# Patient Record
Sex: FEMALE | Race: WHITE
[De-identification: names, ages, dates, MRNs, and addresses within clinical notes are randomized per-mention and may not be internally consistent; named-entity substitution may affect disease eponyms.]

---

## 2017-01-25 ENCOUNTER — HOSPITAL ENCOUNTER (OUTPATIENT)
Dept: HOSPITAL 80 - FIMAGING | Age: 57
End: 2017-01-25
Attending: GENERAL ACUTE CARE HOSPITAL
Payer: COMMERCIAL

## 2017-01-25 DIAGNOSIS — Z12.31: Primary | ICD-10-CM

## 2017-01-25 PROCEDURE — G0202 SCR MAMMO BI INCL CAD: HCPCS

## 2017-01-25 NOTE — MA
Screening Digital Mammogram With Tomosynthesis



Clinical Indications: Routine screening. 



Technique:  Standard digital cephalocaudal and tomosynthesis mediolateral oblique projections are obt
ained. An additional cc view is performed of the left breast. The digital images were processed by [facility] [employer] computer aided detection system. 



Comparison: June 2015, May 2014, April 2013 and March 2012



Breast density: B; There are scattered fibroglandular densities.



Findings: CAD was reviewed.  No suspicious findings are identified.



Impression: Negative mammogram. BI-RADS 1. 



Recommendation:   Routine screening is recommended in one year.



[facility] will send a result letter to the patient.



Negative mammography should not preclude additional workup of a clinically suspicious finding.



The patient's information is entered into a reminder system with a target due date for her next mammo
gram.

## 2017-12-20 ENCOUNTER — HOSPITAL ENCOUNTER (EMERGENCY)
Dept: HOSPITAL 80 - CED | Age: 57
Discharge: HOME | End: 2017-12-20
Payer: COMMERCIAL

## 2017-12-20 VITALS — TEMPERATURE: 98 F | RESPIRATION RATE: 18 BRPM

## 2017-12-20 VITALS — SYSTOLIC BLOOD PRESSURE: 135 MMHG | OXYGEN SATURATION: 97 % | DIASTOLIC BLOOD PRESSURE: 62 MMHG | HEART RATE: 62 BPM

## 2017-12-20 DIAGNOSIS — R10.31: Primary | ICD-10-CM

## 2017-12-20 LAB
% IMMATURE GRANULYOCYTES: 0.4 % (ref 0–1.1)
ABSOLUTE IMMATURE GRANULOCYTES: 0.03 10^3/UL (ref 0–0.1)
ABSOLUTE NRBC COUNT: 0 10^3/UL (ref 0–0.01)
ADD DIFF?: NO
ADD MORPH?: NO
ADD SCAN?: NO
ALBUMIN SERPL-MCNC: 4.5 G/DL (ref 3.5–5)
ALP SERPL-CCNC: 48 IU/L (ref 38–126)
ALT SERPL-CCNC: 34 IU/L (ref 9–52)
ANION GAP SERPL CALC-SCNC: 17 MEQ/L (ref 8–16)
AST SERPL-CCNC: 26 IU/L (ref 14–46)
ATYPICAL LYMPHOCYTE FLAG: 0 (ref 0–99)
BILIRUB SERPL-MCNC: 0.7 MG/DL (ref 0.1–1.4)
CALCIUM SERPL-MCNC: 9.4 MG/DL (ref 8.5–10.4)
CHLORIDE SERPL-SCNC: 96 MEQ/L (ref 97–110)
CO2 SERPL-SCNC: 27 MEQ/L (ref 22–31)
COLOR UR: YELLOW
CREAT SERPL-MCNC: 0.8 MG/DL (ref 0.6–1)
ERYTHROCYTE [DISTWIDTH] IN BLOOD BY AUTOMATED COUNT: 12.7 % (ref 11.5–15.2)
FRAGMENT RBC FLAG: 0 (ref 0–99)
GFR SERPL CREATININE-BSD FRML MDRD: > 60 ML/MIN/{1.73_M2}
GLUCOSE SERPL-MCNC: 128 MG/DL (ref 70–100)
HCT VFR BLD CALC: 44.9 % (ref 38–47)
HGB BLD-MCNC: 14.9 G/DL (ref 12.6–16.3)
LEFT SHIFT FLG: 0 (ref 0–99)
LIPEMIA HEMOLYSIS FLAG: 80 (ref 0–99)
MCH RBC BLDCO QN: 31.4 PG (ref 27.9–34.1)
MCHC RBC AUTO-ENTMCNC: 33.2 G/DL (ref 32.4–36.7)
MCV RBC AUTO: 94.7 FL (ref 81.5–99.8)
NITRITE UR QL STRIP: NEGATIVE
NRBC-AUTO%: 0 % (ref 0–0.2)
PH UR STRIP: 6 [PH] (ref 5–7.5)
PLATELET # BLD: 291 10^3/UL (ref 150–400)
PLATELET CLUMPS FLAG: 0 (ref 0–99)
PMV BLD AUTO: 9.4 FL (ref 8.7–11.7)
POTASSIUM SERPL-SCNC: 3.5 MEQ/L (ref 3.5–5.2)
PROT SERPL-MCNC: 7.8 G/DL (ref 6.3–8.2)
RBC # BLD AUTO: 4.74 10^6/UL (ref 4.18–5.33)
SODIUM SERPL-SCNC: 140 MEQ/L (ref 134–144)
SP GR UR STRIP: <= 1.005 (ref 1–1.03)
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
## 2018-03-08 ENCOUNTER — HOSPITAL ENCOUNTER (OUTPATIENT)
Dept: HOSPITAL 80 - FIMAGING | Age: 58
End: 2018-03-08
Attending: INTERNAL MEDICINE
Payer: COMMERCIAL

## 2018-03-08 DIAGNOSIS — Z12.31: Primary | ICD-10-CM

## 2018-03-21 ENCOUNTER — HOSPITAL ENCOUNTER (OUTPATIENT)
Dept: HOSPITAL 80 - FIMAGING | Age: 58
End: 2018-03-21
Attending: INTERNAL MEDICINE
Payer: COMMERCIAL

## 2018-03-21 DIAGNOSIS — N63.20: Primary | ICD-10-CM

## 2018-04-10 ENCOUNTER — HOSPITAL ENCOUNTER (OUTPATIENT)
Dept: HOSPITAL 80 - FIMAGING | Age: 58
Discharge: HOME | End: 2018-04-10
Attending: INTERNAL MEDICINE
Payer: COMMERCIAL

## 2018-04-10 DIAGNOSIS — N60.12: ICD-10-CM

## 2018-04-10 DIAGNOSIS — D24.2: Primary | ICD-10-CM

## 2018-04-10 DIAGNOSIS — N60.82: ICD-10-CM

## 2018-04-10 PROCEDURE — 0HBU3ZX EXCISION OF LEFT BREAST, PERCUTANEOUS APPROACH, DIAGNOSTIC: ICD-10-PCS | Performed by: RADIOLOGY

## 2018-04-10 PROCEDURE — BH01ZZZ PLAIN RADIOGRAPHY OF LEFT BREAST: ICD-10-PCS | Performed by: RADIOLOGY

## 2018-05-05 ENCOUNTER — HOSPITAL ENCOUNTER (OUTPATIENT)
Dept: HOSPITAL 80 - FED | Age: 58
Setting detail: OBSERVATION
LOS: 1 days | Discharge: HOME | End: 2018-05-06
Attending: FAMILY MEDICINE | Admitting: FAMILY MEDICINE
Payer: COMMERCIAL

## 2018-05-05 DIAGNOSIS — E87.0: Primary | ICD-10-CM

## 2018-05-05 DIAGNOSIS — E87.8: ICD-10-CM

## 2018-05-05 DIAGNOSIS — E87.6: ICD-10-CM

## 2018-05-05 DIAGNOSIS — D50.9: ICD-10-CM

## 2018-05-05 DIAGNOSIS — R11.2: ICD-10-CM

## 2018-05-05 DIAGNOSIS — K59.00: ICD-10-CM

## 2018-05-05 LAB — PLATELET # BLD: 249 10^3/UL (ref 150–400)

## 2018-05-05 PROCEDURE — G0378 HOSPITAL OBSERVATION PER HR: HCPCS

## 2018-05-05 PROCEDURE — 74018 RADEX ABDOMEN 1 VIEW: CPT

## 2018-05-05 RX ADMIN — ACETAMINOPHEN PRN MG: 325 TABLET ORAL at 17:43

## 2018-05-05 RX ADMIN — DOCUSATE SODIUM AND SENNOSIDES SCH TAB: 50; 8.6 TABLET ORAL at 10:40

## 2018-05-05 RX ADMIN — ENOXAPARIN SODIUM SCH: 100 INJECTION SUBCUTANEOUS at 10:40

## 2018-05-05 RX ADMIN — DOCUSATE SODIUM AND SENNOSIDES SCH: 50; 8.6 TABLET ORAL at 22:33

## 2018-05-05 NOTE — PDGENHP
History and Physical





- Chief Complaint


Nausea vomiting the constipation right shoulder pain





- History of Present Illness








Source-patient provides history appears reliable.   at bedside 

supplements details.  EMR reviewed and case discussed with ED provider.





HPI-pleasant 57-year-old female with past medical history significant for TBI, 

depression, functional constipation who presents to the emergency department 

this morning with complaints of 12 hr history of nausea, vomiting, abdominal 

pain, constipation and shoulder pain.  Patient underwent a right rotator cuff 

repair with Dr. Luciano on Thursday.  She is discharged with prescriptions for 

Oxy IR, oxy CR and Flexeril.  Patient had been tolerating her pain regimen 

until yesterday.  She noticed increased abdominal distension and worsened 

constipation.  Patient does not believe she has passed any flatus since 

Thursday.  She denies any history of melena or hematochezia.  She has a 

longstanding history of constipation.  He she denies any fevers she did have 

some chills just before coming to the emergency department she felt secondary 

to pain.  No sick contacts at home.  Patient reports that she developed 

epigastric abdominal cramping type pain but she is unsure if this started after 

her episodes of nausea vomiting or before. 





In the emergency department patient received Zofran and IV Dilaudid with 

improvement in her symptoms and pain.





History Information





- Allergies/Home Medication List


Allergies/Adverse Reactions: 








No Known Allergies Allergy (Unverified 04/04/16 19:34)


 





Home Medications: 








Wellbutrin 150mg SR (RX)  04/04/16 [Last Taken Unknown]


traZODONE  04/04/16 [Last Taken Unknown]


Cymbalta  12/20/17 [Last Taken Unknown]


Estradiol  12/20/17 [Last Taken Unknown]


Progesterone  12/20/17 [Last Taken Unknown]


Cyclobenzaprine  05/05/18 [Last Taken Unknown]


Oxycodone HCl  05/05/18 [Last Taken Unknown]


Oxycodone HCl ER  05/05/18 [Last Taken Unknown]





I have personally reviewed and updated: family history, medical history, social 

history, surgical history





- Past Medical History


Additional medical history: Depression, TBI, functional constipation.





- Family History


Additional family history: No family history of GI issues.





- Social History


Smoking Status: Never smoked


Alcohol Use: Occasionally


Drug Use: None


Additional social history: Patient is  lives with her  locally.  

Cor status-full





Review of Systems


Review of Systems: 





ROS: 10pt was reviewed & negative except for what was stated in HPI & below


Constitutional: Reports: chills.  Denies: fever, weakness


Cardiac: Reports: no symptoms.  Denies: edema, palpitations


Respiratory: Reports: no symptoms.  Denies: shortness of breath


Gastrointestinal: Reports: vomitting, abdominal pain, constipation, nausea, 

other (See HPI)


Genitourinary: Reports: no symptoms.  Denies: dysuria, hematuria


Muscolosketal: Reports: joint pain (Right shoulder), muscle pain (Right shoulder

)


Skin: Reports: no symptoms


Neurological: Reports: depressed (No SI or HI), numbness, tingling (Right hand 

fingers).  Denies: headache, weakness


Hematologic/Lymphatic: Reports: no symptoms





Physical Exam


Physical Exam: 








 Selected Entries











  05/05/18 05/05/18





  02:29 04:49


 


Blood Pressure Automatic Automatic





Method  


 


Heart Rate 77 69


 


Respiratory 18 18





Rate  


 


O2 Sat (%) 95 98


 


Temperature (C) 37.2 C 


 


Blood Pressure 161/91 H 138/88 H


 


Mean Arterial 114 H 104 H





Pressure (MAP)  


 


O2 (L/minute)  2


 


O2 Delivery Room Air Nasal Cannula





Mode  


 


Temperature Oral 





Source  


























Constitutional: no apparent distress, uncomfortable, other (Patient appears 

fatigued)


Eyes: PERRL, anicteric sclera, EOMI, No scleral injection


Ears, Nose, Mouth, Throat: moist mucous membranes, no oral mucosal ulcers, 

other (No nasal discharge)


Cardiovascular: regular rate and rhythym, no murmur, rub, or gallop, pulses 

symmetric bilaterally, No edema


Peripheral Pulses: 2+: dorsalis-pedis (R), dorsalis-pedis (L)


Respiratory: no respiratory distress, no rales or rhonchi, clear to auscultation


Gastrointestinal: normoactive bowel sounds, soft, non-tender abdomen, no 

palpable masses, distension (Abdomen soft but full.)


Genitourinary: no bladder tenderness, No carmona in urethra


Skin: warm, normal color, no rashes or abrasions, other (Mild pallor)


Musculoskeletal: generalized weakness, other (Right arm in immobilizer.  Moves 

distal extremities.)


Neurologic: AAOx3, sensation intact bilaterally, other (Grossly nonfocal), No 

facial droop


Psychiatric: interacting appropriately, not anxious, not encephalopathic, 

thought process linear, flat affect





Lab Data & Imaging Review





 05/05/18 02:50





 05/05/18 02:50














WBC  9.99 10^3/uL (3.80-9.50)  H  05/05/18  02:50    


 


RBC  3.75 10^6/uL (4.18-5.33)  L  05/05/18  02:50    


 


Hgb  12.2 g/dL (12.6-16.3)  L  05/05/18  02:50    


 


Hct  34.8 % (38.0-47.0)  L  05/05/18  02:50    


 


MCV  92.8 fL (81.5-99.8)   05/05/18  02:50    


 


MCH  32.5 pg (27.9-34.1)   05/05/18  02:50    


 


MCHC  35.1 g/dL (32.4-36.7)   05/05/18  02:50    


 


RDW  12.1 % (11.5-15.2)   05/05/18  02:50    


 


Plt Count  249 10^3/uL (150-400)   05/05/18  02:50    


 


MPV  9.4 fL (8.7-11.7)   05/05/18  02:50    


 


Neut % (Auto)  76.7 % (39.3-74.2)  H  05/05/18  02:50    


 


Lymph % (Auto)  14.3 % (15.0-45.0)  L  05/05/18  02:50    


 


Mono % (Auto)  8.0 % (4.5-13.0)   05/05/18  02:50    


 


Eos % (Auto)  0.2 % (0.6-7.6)  L  05/05/18  02:50    


 


Baso % (Auto)  0.3 % (0.3-1.7)   05/05/18  02:50    


 


Nucleat RBC Rel Count  0.0 % (0.0-0.2)   05/05/18  02:50    


 


Absolute Neuts (auto)  7.66 10^3/uL (1.70-6.50)  H  05/05/18  02:50    


 


Absolute Lymphs (auto)  1.43 10^3/uL (1.00-3.00)   05/05/18  02:50    


 


Absolute Monos (auto)  0.80 10^3/uL (0.30-0.80)   05/05/18  02:50    


 


Absolute Eos (auto)  0.02 10^3/uL (0.03-0.40)  L  05/05/18  02:50    


 


Absolute Basos (auto)  0.03 10^3/uL (0.02-0.10)   05/05/18  02:50    


 


Absolute Nucleated RBC  0.00 10^3/uL (0-0.01)   05/05/18  02:50    


 


Immature Gran %  0.5 % (0.0-1.1)   05/05/18  02:50    


 


Immature Gran #  0.05 10^3/uL (0.00-0.10)   05/05/18  02:50    


 


Sodium  124 mEq/L (135-145)  L  05/05/18  02:50    


 


Potassium  3.6 mEq/L (3.5-5.2)   05/05/18  02:50    


 


Chloride  86 mEq/L ()  L  05/05/18  02:50    


 


Carbon Dioxide  27 mEq/l (22-31)   05/05/18  02:50    


 


Anion Gap  11 mEq/L (8-16)   05/05/18  02:50    


 


BUN  10 mg/dL (7-23)   05/05/18  02:50    


 


Creatinine  0.6 mg/dL (0.6-1.0)   05/05/18  02:50    


 


Estimated GFR  > 60   05/05/18  02:50    


 


Glucose  110 mg/dL ()  H  05/05/18  02:50    


 


Serum Osmolality  257 mosmo/kg (280-297)  L  05/05/18  02:50    


 


Calcium  8.4 mg/dL (8.5-10.4)  L  05/05/18  02:50    


 


Magnesium  1.8 mg/dL (1.6-2.3)   05/05/18  02:50    


 


Total Bilirubin  0.9 mg/dL (0.1-1.4)   05/05/18  02:50    


 


Conjugated Bilirubin  0.2 mg/dL (0.0-0.5)   05/05/18  02:50    


 


Unconjugated Bilirubin  0.7 mg/dL (0.0-1.1)   05/05/18  02:50    


 


AST  25 IU/L (14-46)   05/05/18  02:50    


 


ALT  34 IU/L (9-52)   05/05/18  02:50    


 


Alkaline Phosphatase  44 IU/L ()   05/05/18  02:50    


 


Total Protein  6.5 g/dL (6.3-8.2)   05/05/18  02:50    


 


Albumin  3.9 g/dL (3.5-5.0)   05/05/18  02:50    


 


Lipase  125 IU/L ()   05/05/18  02:50    








Imaging Review: 





KUB reviewed myself-nonspecific bowel gas pattern.  Constipation.  Air-fluid 

level in the stomach.





Assessment & Plan


Assessment: 








57-year-old female POD #2  status post right rotator cuff repair presents to 

the ED with nausea vomiting constipation and right shoulder pain.





Hyponatremia (Acute) - patient with acute nausea vomiting with GI losses.  His 

serum osmolality slightly decreased representing hypovolemic hyponatremia.  

Patient received 1 L fluid bolus in the emergency department.  Will continue 

with some gentle IV fluid hydration as patient's nausea improves and advance 

diet encourage oral hydration.  Check a UA and urine studies.  Repeat BMP.





Nausea/Vomiting (Acute) - likely multifactorial including narcotics and 

constipation.  Bowel regimen has been ordered.  Zofran and Phenergan available 

p.r.n. The patient has not been utilizing scheduled stool softeners since her 

surgery. 





Abdominal pain - patient complaining of some upper abdominal cramping and pain 

and likely related to patient's nausea vomiting.  Lipase is slightly elevated 

but this is less than 3 times upper limit of normal and likely also related to 

nausea vomiting.  LFTs within normal limits this does not represent 

pancreatitis.





Constipation (Acute) - bowel regimen ordered.





Hypochloremia in setting of hypovolemia and hyponatremia status post normal 

saline repeat BMP this morning.





Anemia microcytic-last CBC shows normal H&H in September 2017. No evidence of 

active bleeding.  Patient denies any history of melena or hematochezia.





FEN - continue with IV fluids.  Monitoring electrolytes replace if needed.  

Advance diet as tolerated.


PPX-SCDs.  Holding anticoagulation expecting a short hospital stay.  Encourage 

ambulation.


Cor status-full


Disposition-patient admitted to observation status on the medical floor at this 

time.  Pending repeat laboratory studies, hydration and bowel movement possible 

discharge later today or tomorrow.
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
## 2018-05-05 NOTE — HOSPPROG
Hospitalist Progress Note


Assessment/Plan: 





57-year-old female POD #2 status post right rotator cuff repair presents to the 

ED with nausea vomiting constipation and right shoulder pain. First encounter, 

chart reviewed.





#Hyponatremia (Acute) - 


-patient with acute nausea vomiting with GI losses. 


-serum osmolality slightly decreased representing hypovolemic hyponatremia.  


-Patient received 1 L fluid bolus in the emergency department.  


-Will continue with some gentle IV fluid hydration as patient's nausea improves 

and advance diet encourage oral hydration.  


-urine studies unclear.  Repeat BMP in am





#Nausea/Vomiting (Acute) - 


-likely multifactorial including narcotics and constipation.  


-Bowel regimen has been ordered.  


-Zofran and Phenergan available p.r.n. 


-The patient has not been utilizing scheduled stool softeners since her 

surgery. 





#Abdominal pain - 


-patient complaining of some upper abdominal cramping and pain and likely 

related to patient's nausea vomiting.  


-Lipase is normal


- LFTs within normal limits this does not represent pancreatitis.





#Constipation (Acute) -


-bowel regimen ordered.





#Hypochloremia-


-in setting of hypovolemia and hyponatremia status post normal saline repeat 

BMP in am





#Hypokalemia


-replace





#Anemia microcytic-


-last CBC shows normal H&H in September 2017. 


-post surgical


-No evidence of active bleeding.  Patient denies any history of melena or 

hematochezia.





#FEN - continue with IV fluids.  Monitoring electrolytes replace if needed.  

Advance diet as tolerated.





#PPX-SCDs.  Holding anticoagulation expecting a short hospital stay.  Encourage 

ambulation.





Cor status-full





#Disposition-cont observation


anticipate DC when sodium better and constipation resolves.


Subjective: Still feeling ill.


Objective: 


 Vital Signs











Temp Pulse Resp BP Pulse Ox


 


 36.8 C   71   20   123/73 H  94 


 


 05/05/18 11:46  05/05/18 11:46  05/05/18 11:46  05/05/18 11:46  05/05/18 11:46








 Laboratory Results





 05/05/18 07:32 





 











 05/04/18 05/05/18 05/06/18





 05:59 05:59 05:59


 


Intake Total  2000 1600


 


Output Total  500 2050


 


Balance  1500 -450














- Physical Exam


Constitutional: no apparent distress, appears nourished


Eyes: PERRL, anicteric sclera


Ears, Nose, Mouth, Throat: moist mucous membranes, hearing normal, ears appear 

normal


Cardiovascular: regular rate and rhythym, No JVD


Respiratory: no respiratory distress, reduced air movement


Gastrointestinal: No tenderness, No ascites


Skin: warm, normal color


Musculoskeletal: pain with ROM, generalized weakness


Neurologic: AAOx3


Psychiatric: not anxious, not encephalopathic, thought process linear





ICD10 Worksheet


Patient Problems: 


 Problems











Problem Status Onset


 


Constipation Acute  


 


Hyponatremia Acute  


 


Vomiting Acute

## 2018-05-06 VITALS — DIASTOLIC BLOOD PRESSURE: 72 MMHG | SYSTOLIC BLOOD PRESSURE: 105 MMHG

## 2018-05-06 RX ADMIN — ENOXAPARIN SODIUM SCH MG: 100 INJECTION SUBCUTANEOUS at 08:17

## 2018-05-06 RX ADMIN — ACETAMINOPHEN PRN MG: 325 TABLET ORAL at 04:57

## 2018-05-06 RX ADMIN — ACETAMINOPHEN PRN MG: 325 TABLET ORAL at 09:05

## 2018-05-06 RX ADMIN — DOCUSATE SODIUM AND SENNOSIDES SCH: 50; 8.6 TABLET ORAL at 08:20

## 2018-05-06 NOTE — ASMTLACE
LACE

 

Length of stay for            Answers:  1 day                                 

current admission                                                             

Acuity / Level of             Answers:  No                                    

Care: Did the patient                                                         

have an inpatient                                                             

admission?                                                                    

Comorbidities - select        Answers:  Other                         Notes:  TBI

all that apply                                                                

# of Emergency department     Answers:  1-2                                   

visits in the last 6                                                          

months                                                                        

Social determinants           Answers:  Mental health diagnosis               

                                        (anxiety, depression, pers            

                                        onality disorders, etc.)              

Score: 6

 

Date Signed:  05/06/2018 11:24 AM

Electronically Signed By:Rebecca Suarez RN

## 2018-05-06 NOTE — ASDISCHSUM
----------------------------------------------

Discharge Information

----------------------------------------------

Plan Status:Home with No Needs                       Medically Cleared to Leave:05/06/2018

Discharge Date:05/06/2018                            CM D/C Disposition:Home, Routine, Self-Care

ADT D/C Disposition:Home, Routine, Self-Care         Projected Discharge Date:05/06/2018 12:00 PM

Transportation at D/C:Family                         Discharge Delay Reason:

Follow-Up Date:05/06/2018 12:00 PM                   Discharge Slot:

Final Diagnosis:

----------------------------------------------

Placement Information

----------------------------------------------

----------------------------------------------

Patient Contact Information

----------------------------------------------

Contact Name:MARITA                            Relationship:

Address:[address] [place] [address] [phone]

                                                     Work [phone]

City:[place]                                         Alternate Phone:

[facility] Code:[place] [id number]                              Email:

----------------------------------------------

Financial Information

----------------------------------------------

Financial Class:Medicare

Primary Plan Desc:MEDICARE OUTPATIENT                Primary Plan Number:[id number]

Secondary Plan Desc:[employer] PPO POS HMO SIG ADM        Secondary Plan Number:[id number]

 

 

----------------------------------------------

Assessment Information

----------------------------------------------

----------------------------------------------

BC CM Progress Note

----------------------------------------------

CM Note

 

CM Note                       

Notes:

Anticipate dc home independently when medically stable. CM available if needs/changes. 

 

Date Signed:  05/05/2018 11:02 AM

Electronically Signed By:Rebecca Suarez RN

 

 

----------------------------------------------

LACE

----------------------------------------------

LACE

 

Length of stay for            Answers:  1 day                                 

current admission                                                             

Acuity / Level of             Answers:  No                                    

Care: Did the patient                                                         

have an inpatient                                                             

admission?                                                                    

Comorbidities - select        Answers:  Other                         Notes:  TBI

all that apply                                                                

# of Emergency department     Answers:  1-2                                   

visits in the last 6                                                          

months                                                                        

Social determinants           Answers:  Mental health diagnosis               

                                        (anxiety, depression, pers            

                                        onality disorders, etc.)              

Score: 6

 

Date Signed:  05/06/2018 11:24 AM

Electronically Signed By:Rebecca Suarez RN

 

 

----------------------------------------------

Intervention Information

----------------------------------------------

Intervention Type:*CARREON-Signed                       Date of Service:05/06/2018 10:16 AM

Patient Type:Observation                             Staff Member:HERMAN Suarez Courtney

Hours:                                               Discipline:

Severity:                                            Comment:

## 2018-05-06 NOTE — GDS
[f rep st]



                                                             DISCHARGE SUMMARY





DISCHARGE DIAGNOSES:  

1.  Hypernatremia.

2.  Nausea, vomiting.

3.  Constipation.

4.  Abdominal pain.

5.  Hypochloremia.

6.  Hypokalemia.

7.  Microcytic anemia.



PHYSICAL EXAM:  GENERAL:  The patient is alert.  VITAL SIGNS:  Afebrile at 36.9, pulse 74, respirator
y rate 16, blood pressure is 105/72.  She is saturating greater than 90% on room air.  I have seen an
d evaluated the patient on the day of discharge.



HOSPITAL COURSE:  Ms. Ward is a 57-year-old female who presented to the emergency room 2 days after
 having rotator cuff surgery complaining of nausea and vomiting.  She was evaluated and diagnosed wit
h:

1.  Constipation.  This is in the setting of narcotic use.  Her constipation has completely resolved 
with bowel therapy.

2.  Nausea and vomiting.  This has resolved.

3.  Hyponatremia, is in the setting of gastrointestinal loss.  She has responded well to fluid bolus.
  Her sodium is within normal limits.

4.  Hyperchloremia.  Again, this has been replenished with fluids.

5.  Microcytic anemia.  This is in the postoperative setting.



DISPOSITION:  The patient will be discharged home independently.



FOLLOWUP:  Will be with her primary care physician, as well as her orthopedist as previously prescrib
ed.



DISCHARGE MEDICATIONS:  Please refer to EMR form.  I have not adjusted the patient's previously presc
ribed home medications to the best of my knowledge, other than discontinuation of her narcotic use.





Job #:  [id number]

## 2019-01-11 ENCOUNTER — HOSPITAL ENCOUNTER (OUTPATIENT)
Dept: HOSPITAL 80 - FIMAGING | Age: 59
End: 2019-01-11
Attending: INTERNAL MEDICINE
Payer: COMMERCIAL

## 2019-01-11 DIAGNOSIS — R92.8: Primary | ICD-10-CM

## 2021-10-12 NOTE — ASMTCMCOM
CM Note

 

CM Note                       

Notes:

Anticipate dc home independently when medically stable. CM available if needs/changes. 

 

Date Signed:  05/05/2018 11:02 AM

Electronically Signed By:Rebecca Suarez RN none